# Patient Record
Sex: MALE
[De-identification: names, ages, dates, MRNs, and addresses within clinical notes are randomized per-mention and may not be internally consistent; named-entity substitution may affect disease eponyms.]

---

## 2019-01-28 PROBLEM — D12.4 BENIGN NEOPLASM OF DESCENDING COLON: Status: ACTIVE | Noted: 2019-01-28

## 2019-01-28 PROBLEM — K57.30 DIVERTICULOSIS OF LARGE INTESTINE WITHOUT PERFORATION OR ABSCESS WITHOUT BLEEDING: Status: ACTIVE | Noted: 2019-01-28

## 2019-01-28 PROBLEM — K63.5 POLYP OF COLON: Status: ACTIVE | Noted: 2019-01-28

## 2019-01-28 PROBLEM — D12.3 BENIGN NEOPLASM OF TRANSVERSE COLON: Status: ACTIVE | Noted: 2019-01-28

## 2019-01-28 PROBLEM — K64.8 OTHER HEMORRHOIDS: Status: ACTIVE | Noted: 2019-01-28

## 2019-01-28 PROBLEM — Z86.010 PERSONAL HISTORY OF COLONIC POLYPS: Status: ACTIVE | Noted: 2019-01-28

## 2020-01-01 ENCOUNTER — EXTERNAL RECORD (OUTPATIENT)
Dept: HEALTH INFORMATION MANAGEMENT | Facility: OTHER | Age: 70
End: 2020-01-01

## 2020-03-12 ENCOUNTER — HOSPITAL ENCOUNTER (OUTPATIENT)
Dept: CT IMAGING | Facility: HOSPITAL | Age: 70
Discharge: HOME | End: 2020-03-12
Attending: INTERNAL MEDICINE

## 2020-03-12 DIAGNOSIS — Z13.9 ENCOUNTER FOR SCREENING: ICD-10-CM

## 2020-03-12 NOTE — IMAGING NOTE
Patient seen at [facility] for Heart Scan CT    PRELIMINARY SCORE: 163    /80  Declined Cholesterol check. States that his cholesterol is high but does not want to be medicated.   All results and risk factors discussed with patient; all questions and concern

## 2020-03-14 ENCOUNTER — ORDER TRANSCRIPTION (OUTPATIENT)
Dept: ADMINISTRATIVE | Facility: HOSPITAL | Age: 70
End: 2020-03-14

## 2020-03-14 DIAGNOSIS — Z13.9 SCREENING PROCEDURE: Primary | ICD-10-CM

## 2020-03-16 ENCOUNTER — HOSPITAL ENCOUNTER (OUTPATIENT)
Dept: CARDIOLOGY CLINIC | Facility: HOSPITAL | Age: 70
Discharge: HOME | End: 2020-03-16
Attending: INTERNAL MEDICINE

## 2020-03-16 DIAGNOSIS — Z13.9 SCREENING PROCEDURE: ICD-10-CM

## 2020-03-18 ENCOUNTER — TELEPHONE (OUTPATIENT)
Dept: CARDIOLOGY | Age: 70
End: 2020-03-18

## 2020-03-18 ENCOUNTER — EXTERNAL RECORD (OUTPATIENT)
Dept: CARDIOLOGY | Age: 70
End: 2020-03-18

## 2020-06-17 ENCOUNTER — TELEPHONE (OUTPATIENT)
Dept: CARDIOLOGY | Age: 70
End: 2020-06-17

## 2020-11-11 ENCOUNTER — APPOINTMENT (OUTPATIENT)
Dept: CARDIOLOGY | Age: 70
End: 2020-11-11

## 2021-03-15 DIAGNOSIS — Z23 NEED FOR VACCINATION: ICD-10-CM

## 2021-04-16 ENCOUNTER — LAB ENCOUNTER (OUTPATIENT)
Dept: LAB | Facility: HOSPITAL | Age: 71
End: 2021-04-16
Attending: INTERNAL MEDICINE
Payer: MEDICARE

## 2021-04-16 DIAGNOSIS — D72.829 ELEVATED WBCS: Primary | ICD-10-CM

## 2021-04-16 PROCEDURE — 36415 COLL VENOUS BLD VENIPUNCTURE: CPT

## 2021-04-16 PROCEDURE — 85025 COMPLETE CBC W/AUTO DIFF WBC: CPT

## 2021-07-07 ENCOUNTER — LAB ENCOUNTER (OUTPATIENT)
Dept: LAB | Facility: HOSPITAL | Age: 71
End: 2021-07-07
Attending: INTERNAL MEDICINE
Payer: MEDICARE

## 2021-07-07 DIAGNOSIS — D72.829 ELEVATED WBCS: Primary | ICD-10-CM

## 2021-07-07 LAB
BASOPHILS # BLD AUTO: 0.05 X10(3) UL (ref 0–0.2)
BASOPHILS NFR BLD AUTO: 0.4 %
DEPRECATED RDW RBC AUTO: 47.4 FL (ref 35.1–46.3)
EOSINOPHIL # BLD AUTO: 0.21 X10(3) UL (ref 0–0.7)
EOSINOPHIL NFR BLD AUTO: 1.7 %
ERYTHROCYTE [DISTWIDTH] IN BLOOD BY AUTOMATED COUNT: 13.7 % (ref 11–15)
HCT VFR BLD AUTO: 47.1 %
HGB BLD-MCNC: 15.7 G/DL
IMM GRANULOCYTES # BLD AUTO: 0.02 X10(3) UL (ref 0–1)
IMM GRANULOCYTES NFR BLD: 0.2 %
LYMPHOCYTES # BLD AUTO: 6.7 X10(3) UL (ref 1–4)
LYMPHOCYTES NFR BLD AUTO: 55.1 %
MCH RBC QN AUTO: 31.5 PG (ref 26–34)
MCHC RBC AUTO-ENTMCNC: 33.3 G/DL (ref 31–37)
MCV RBC AUTO: 94.4 FL
MONOCYTES # BLD AUTO: 0.67 X10(3) UL (ref 0.1–1)
MONOCYTES NFR BLD AUTO: 5.5 %
NEUTROPHILS # BLD AUTO: 4.52 X10 (3) UL (ref 1.5–7.7)
NEUTROPHILS # BLD AUTO: 4.52 X10(3) UL (ref 1.5–7.7)
NEUTROPHILS NFR BLD AUTO: 37.1 %
PLATELET # BLD AUTO: 243 10(3)UL (ref 150–450)
RBC # BLD AUTO: 4.99 X10(6)UL
WBC # BLD AUTO: 12.2 X10(3) UL (ref 4–11)

## 2021-07-07 PROCEDURE — 85025 COMPLETE CBC W/AUTO DIFF WBC: CPT

## 2021-07-07 PROCEDURE — 36415 COLL VENOUS BLD VENIPUNCTURE: CPT

## 2021-07-22 ENCOUNTER — OFFICE VISIT (OUTPATIENT)
Dept: HEMATOLOGY/ONCOLOGY | Facility: HOSPITAL | Age: 71
End: 2021-07-22
Attending: INTERNAL MEDICINE
Payer: MEDICARE

## 2021-07-22 VITALS
RESPIRATION RATE: 18 BRPM | HEART RATE: 71 BPM | DIASTOLIC BLOOD PRESSURE: 78 MMHG | TEMPERATURE: 98 F | SYSTOLIC BLOOD PRESSURE: 123 MMHG | BODY MASS INDEX: 26 KG/M2 | WEIGHT: 204.38 LBS | OXYGEN SATURATION: 99 %

## 2021-07-22 DIAGNOSIS — R91.1 LUNG NODULE < 6CM ON CT: ICD-10-CM

## 2021-07-22 DIAGNOSIS — Q25.40 ABNORMALITY OF THORACIC AORTA: ICD-10-CM

## 2021-07-22 DIAGNOSIS — D72.820 LYMPHOCYTOSIS: Primary | ICD-10-CM

## 2021-07-22 LAB
BASOPHILS # BLD AUTO: 0.05 X10(3) UL (ref 0–0.2)
BASOPHILS NFR BLD AUTO: 0.4 %
CRP SERPL-MCNC: <0.29 MG/DL (ref ?–0.3)
DEPRECATED RDW RBC AUTO: 46.3 FL (ref 35.1–46.3)
EOSINOPHIL # BLD AUTO: 0.15 X10(3) UL (ref 0–0.7)
EOSINOPHIL NFR BLD AUTO: 1.1 %
ERYTHROCYTE [DISTWIDTH] IN BLOOD BY AUTOMATED COUNT: 13.4 % (ref 11–15)
HCT VFR BLD AUTO: 46.4 %
HGB BLD-MCNC: 15.8 G/DL
IMM GRANULOCYTES # BLD AUTO: 0.03 X10(3) UL (ref 0–1)
IMM GRANULOCYTES NFR BLD: 0.2 %
LDH SERPL L TO P-CCNC: 211 U/L
LYMPHOCYTES # BLD AUTO: 6.77 X10(3) UL (ref 1–4)
LYMPHOCYTES NFR BLD AUTO: 48 %
MCH RBC QN AUTO: 32.1 PG (ref 26–34)
MCHC RBC AUTO-ENTMCNC: 34.1 G/DL (ref 31–37)
MCV RBC AUTO: 94.3 FL
MONOCYTES # BLD AUTO: 0.69 X10(3) UL (ref 0.1–1)
MONOCYTES NFR BLD AUTO: 4.9 %
NEUTROPHILS # BLD AUTO: 6.41 X10 (3) UL (ref 1.5–7.7)
NEUTROPHILS # BLD AUTO: 6.41 X10(3) UL (ref 1.5–7.7)
NEUTROPHILS NFR BLD AUTO: 45.4 %
PLATELET # BLD AUTO: 243 10(3)UL (ref 150–450)
RBC # BLD AUTO: 4.92 X10(6)UL
SED RATE-ML: 8 MM/HR
WBC # BLD AUTO: 14.1 X10(3) UL (ref 4–11)

## 2021-07-22 PROCEDURE — 99205 OFFICE O/P NEW HI 60 MIN: CPT | Performed by: INTERNAL MEDICINE

## 2021-07-22 RX ORDER — MULTIVITAMIN
1 TABLET ORAL DAILY
COMMUNITY
End: 2021-11-11

## 2021-07-22 RX ORDER — CHLORAL HYDRATE 500 MG
1000 CAPSULE ORAL DAILY
COMMUNITY
End: 2021-11-11

## 2021-07-22 RX ORDER — RIBOFLAVIN (VITAMIN B2) 100 MG
100 TABLET ORAL DAILY
COMMUNITY
End: 2021-11-11

## 2021-07-22 RX ORDER — UBIDECARENONE/VIT E ACET 100MG-5
CAPSULE ORAL
COMMUNITY
End: 2021-11-11

## 2021-07-22 RX ORDER — MULTIVIT-MIN/IRON/FOLIC ACID/K 18-600-40
CAPSULE ORAL
COMMUNITY
End: 2021-11-11

## 2021-07-22 RX ORDER — SIMVASTATIN 20 MG
20 TABLET ORAL NIGHTLY
COMMUNITY
End: 2021-11-11

## 2021-07-22 NOTE — PATIENT INSTRUCTIONS
For triage nurse: [phone] Monday through Friday 7:30-5:00.  *Please note this is a new phone number*    After hours or weekends for emergent needs:  [phone].      To schedule diagnostic testing: Central Scheduling: [place] [id number]

## 2021-07-26 LAB
ALBUMIN SERPL ELPH-MCNC: 4.7 G/DL (ref 3.75–5.21)
ALBUMIN/GLOB SERPL: 1.74 {RATIO} (ref 1–2)
ALPHA1 GLOB SERPL ELPH-MCNC: 0.32 G/DL (ref 0.19–0.46)
ALPHA2 GLOB SERPL ELPH-MCNC: 0.73 G/DL (ref 0.48–1.05)
B-GLOBULIN SERPL ELPH-MCNC: 0.71 G/DL (ref 0.68–1.23)
GAMMA GLOB SERPL ELPH-MCNC: 0.94 G/DL (ref 0.62–1.7)
KAPPA LC FREE SER-MCNC: 1.83 MG/DL (ref 0.33–1.94)
KAPPA LC FREE/LAMBDA FREE SER NEPH: 1.93 {RATIO} (ref 0.26–1.65)
LAMBDA LC FREE SERPL-MCNC: 0.95 MG/DL (ref 0.57–2.63)
M PROTEIN MFR SERPL ELPH: 7.4 G/DL (ref 6.4–8.2)

## 2021-07-30 LAB
CD10 CELLS NFR SPEC: 1 %
CD11C CELLS NFR SPEC: 22 %
CD14 CELLS NFR SPEC: 1 %
CD19 CELLS NFR SPEC: 69 %
CD19/CD10 CELLS: <1 %
CD20 CELLS NFR SPEC: 63 %
CD22 CELLS NFR SPEC: 63 %
CD23 CELLS NFR SPEC: 64 %
CD3 CELLS NFR SPEC: 23 %
CD3+CD4+ CELLS NFR SPEC: 20 %
CD3+CD4+ CELLS/CD3+CD8+ CLL SPEC: 6.7
CD3+CD8+ CELLS NFR SPEC: 3 %
CD38 CELLS NFR SPEC: <1 %
CD45 CELLS NFR SPEC: 100 %
CD5 CELLS NFR SPEC: 89 %
CD5/CD19 CELLS: 67 %
CD56 CELLS NFR SPEC: 6 %
CD7 CELLS NFR SPEC: 26 %
CELL SURF KAPPA/LAMBDA RATIO: 1.7
CELL SURF LAMBDA LIGHT CHAIN: 3 %
CELL SURFACE KAPPA LIGHT CHAIN: 5 %
CYTOPLASMIC KAPPA CELLS: 50 %
CYTOPLASMIC LAMBDA CELLS: 22 %
FMC7 CELLS NFR SPEC: 2 %

## 2021-08-03 NOTE — CONSULTS
Cancer Center Report of Consultation    Patient Name: Ilda Webb   YOB: 1950   Medical Record Number: [id number]   CSN: [id number]   Consulting Physician: Khoa Giraldo MD  Referring Physician(s): Joby Servin MD      Date of C Smoking status: Never Smoker      Smokeless tobacco: Never Used    Vaping Use      Vaping Use: Never used    Substance and Sexual Activity      Alcohol use: Yes        Comment: Very rarely- - usually a beer      Drug use: No      Sexual activity: Not on fi 14-point ROS was done with pertinent positives and negative per the HPI    Vital Signs:  /78 (BP Location: Left arm, Patient Position: Sitting, Cuff Size: adult)   Pulse 71   Temp 97.7 °F (36.5 °C) (Tympanic)   Resp 18   Wt 92.7 kg (204 lb 6.4 oz) Protein 7.4 07/22/2021 1347             Radiology:    PROCEDURE: CT CALCIUM SCORING OVER READ       COMPARISON: None.       INDICATIONS: Baseline study       NOTE:   This patient identified you as their physician. Marlon Bergman this is not correct, please contact th pulmonary malignancy, no additional follow-up is needed.  If patient has risk factors for pulmonary malignancy a follow-up CT in 1 year is recommended. 2. Mild ectasia of the ascending aorta.    3. Multiple incompletely characterized hypoattenuating hepat possible.         INCIDENTAL FINDINGS:  Please refer to the radiologist's separate over read report.                   Impression   CONCLUSION:  Calcium score of 163.       Reference Database: Thierno. 2001       Calcium Score Implication Risk of Coronary Art cardiology      W/u as ordered    RTC 2 weeks    Verner Crape, MD  [facility] [place] [facility]

## 2021-08-05 ENCOUNTER — OFFICE VISIT (OUTPATIENT)
Dept: HEMATOLOGY/ONCOLOGY | Facility: HOSPITAL | Age: 71
End: 2021-08-05
Attending: INTERNAL MEDICINE
Payer: MEDICARE

## 2021-08-05 VITALS
DIASTOLIC BLOOD PRESSURE: 82 MMHG | HEART RATE: 87 BPM | TEMPERATURE: 97 F | SYSTOLIC BLOOD PRESSURE: 127 MMHG | OXYGEN SATURATION: 98 % | BODY MASS INDEX: 25 KG/M2 | RESPIRATION RATE: 18 BRPM | WEIGHT: 201 LBS

## 2021-08-05 DIAGNOSIS — Q25.40 ABNORMALITY OF THORACIC AORTA: ICD-10-CM

## 2021-08-05 DIAGNOSIS — D72.820 LYMPHOCYTOSIS: Primary | ICD-10-CM

## 2021-08-05 DIAGNOSIS — D72.820 MONOCLONAL B-CELL LYMPHOCYTOSIS: ICD-10-CM

## 2021-08-05 DIAGNOSIS — R91.1 LUNG NODULE < 6CM ON CT: ICD-10-CM

## 2021-08-05 PROCEDURE — 99215 OFFICE O/P EST HI 40 MIN: CPT | Performed by: INTERNAL MEDICINE

## 2021-08-05 NOTE — PROGRESS NOTES
Cancer Center Progress Note    Patient Name: Jaguar Shaw   YOB: 1950   Medical Record Number: [id number]   CSN: [id number]   Attending Physician: Blanco Mckenzie M.D.    Referring Physician: Fozia Moreno MD      Date of Visit: 8/5/ activity: Not on file    Other Topics      Concerns:        Not on file    Social History Narrative      Not on file    Social Determinants of Health  Financial Resource Strain:       Difficulty of Paying Living Expenses:   Food Insecurity:       Worried A kg (201 lb)   SpO2 98%   BMI 25.12 kg/m²     Physical Examination:  General: Patient is alert and oriented x 3, not in acute distress. HEENT: EOMs intact. PERRL. Oropharynx is clear. Neck: No JVD. No palpable lymphadenopathy. Neck is supple.   Chest: Anthony cytometry sample was found to contain approximately:   40%Lymphocytes (bright CD45+, low side scatter)   23%CD3+T cells  69%CD19+B cells  6 %NK cells (CD3-/CD16+CD56+)    FLOW CYTOMETRY RESULTS INTERPRETATION:     A 20 marker panel was performed on the sub Ref Range: 87 - 241 U/L 211   KAPPA FREE LIGHT CHAIN Latest Ref Range: 0.330 - 1.940 mg/dL 1.829   LAMBDA FREE LIGHT CHAIN Latest Ref Range: 0.571 - 2.630 mg/dL 0.949   KAPPA/LAMBDA FLC RATIO Latest Ref Range: 0.26 - 1.65  1.93 (H)   TOTAL PROTEIN Latest R cardiologist.         FINDINGS:     MEDIASTINUM/MAURISIO: Normal visualized portions. .  No mass or adenopathy.     LUNGS/PLEURA: A 5 x 4 mm peripheral right middle lobe pulmonary nodule on image 52 series 4.  .     VASCULATURE: Mild ectasia of the ascending aor scanner.  Coronary artery calcium scanning with 3-dimensional scoring was   performed according to standardized protocol. Automated exposure control for dose reduction was used. Adjustment of the mA and/or kV was done based on the patient's size.  Use of it Impression & Plan:   1. Elevated WBC, lymphocytosis- I reviewed his labs with him and his wife. 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  As monoclonal B cell population is <5000 (5159) with no systemic

## 2021-11-11 ENCOUNTER — OFFICE VISIT (OUTPATIENT)
Dept: HEMATOLOGY/ONCOLOGY | Facility: HOSPITAL | Age: 71
End: 2021-11-11
Attending: INTERNAL MEDICINE
Payer: MEDICARE

## 2021-11-11 VITALS
TEMPERATURE: 98 F | RESPIRATION RATE: 18 BRPM | DIASTOLIC BLOOD PRESSURE: 93 MMHG | BODY MASS INDEX: 25 KG/M2 | OXYGEN SATURATION: 99 % | SYSTOLIC BLOOD PRESSURE: 144 MMHG | HEART RATE: 75 BPM | WEIGHT: 202 LBS

## 2021-11-11 DIAGNOSIS — R91.1 LUNG NODULE < 6CM ON CT: ICD-10-CM

## 2021-11-11 DIAGNOSIS — Q25.40 ABNORMALITY OF THORACIC AORTA: ICD-10-CM

## 2021-11-11 DIAGNOSIS — D72.820 LYMPHOCYTOSIS: Primary | ICD-10-CM

## 2021-11-11 DIAGNOSIS — D72.820 MONOCLONAL B-CELL LYMPHOCYTOSIS: ICD-10-CM

## 2021-11-11 PROCEDURE — 99214 OFFICE O/P EST MOD 30 MIN: CPT | Performed by: INTERNAL MEDICINE

## 2021-11-11 NOTE — PROGRESS NOTES
Cancer Center Progress Note    Patient Name: Rosy Sanches   YOB: 1950   Medical Record Number: [id number]   CSN: [id number]   Attending Physician: Christiano Carlisle M.D.    Referring Physician: Mardene Pallas, MD      Date of Visit: 11/1       Spouse name: Not on file      Number of children: Not on file      Years of education: Not on file      Highest education level: Not on file    Occupational History      Not on file    Tobacco Use      Smoking status: Never Smoker      Smokele 11:24 AM   Result Value Ref Range    Glucose 98 70 - 99 mg/dL    Sodium 138 136 - 145 mmol/L    Potassium 4.5 3.5 - 5.1 mmol/L    Chloride 107 98 - 112 mmol/L    CO2 27.0 21.0 - 32.0 mmol/L    Anion Gap 4 0 - 18 mmol/L    BUN 12 7 - 18 mg/dL    Creatinine [address] Latest Ref Range: 0.330 - 1.940 mg/dL 1.829   LAMBDA FREE LIGHT CHAIN Latest Ref Range: 0.571 - 2.630 mg/dL 0.949   KAPPA/LAMBDA FLC RATIO Latest Ref Range: 0.26 - 1.65  1.93 (H)   TOTAL PROTEIN Latest Ref Range: 6.4 - 8.2 g/dL FINDINGS:     MEDIASTINUM/MAURISIO: Normal visualized portions. .  No mass or adenopathy.     LUNGS/PLEURA: A 5 x 4 mm peripheral right middle lobe pulmonary nodule on image 52 series 4. .     VASCULATURE: Mild ectasia of the ascending aorta-4.1 cm.  Normal ca artery calcium scanning with 3-dimensional scoring was   performed according to standardized protocol. Automated exposure control for dose reduction was used. Adjustment of the mA and/or kV was done based on the patient's size.  Use of iterative reconstruct Plan:   1. MBL-  I reviewed his labs with him. WBC/lymphocytosis lower than previous visit. Rest of his labs normal. Recommend continued monitoring for progression of MBL to CLL. He is asymptomatic. We have discussed indications for treatment of CLL/SLL.  H

## 2022-02-10 ENCOUNTER — OFFICE VISIT (OUTPATIENT)
Dept: HEMATOLOGY/ONCOLOGY | Facility: HOSPITAL | Age: 72
End: 2022-02-10
Attending: INTERNAL MEDICINE
Payer: MEDICARE

## 2022-02-10 VITALS
SYSTOLIC BLOOD PRESSURE: 150 MMHG | DIASTOLIC BLOOD PRESSURE: 79 MMHG | BODY MASS INDEX: 25.61 KG/M2 | OXYGEN SATURATION: 100 % | HEIGHT: 75 IN | RESPIRATION RATE: 16 BRPM | TEMPERATURE: 97 F | WEIGHT: 206 LBS | HEART RATE: 69 BPM

## 2022-02-10 DIAGNOSIS — D72.820 LYMPHOCYTOSIS: ICD-10-CM

## 2022-02-10 DIAGNOSIS — D72.820 MONOCLONAL B-CELL LYMPHOCYTOSIS: ICD-10-CM

## 2022-02-10 DIAGNOSIS — R91.1 LUNG NODULE < 6CM ON CT: Primary | ICD-10-CM

## 2022-02-10 DIAGNOSIS — Q25.40 ABNORMALITY OF THORACIC AORTA: ICD-10-CM

## 2022-02-10 LAB
ALBUMIN SERPL-MCNC: 4 G/DL (ref 3.4–5)
ALBUMIN/GLOB SERPL: 1.1 {RATIO} (ref 1–2)
ALP LIVER SERPL-CCNC: 97 U/L
ALT SERPL-CCNC: 23 U/L
ANION GAP SERPL CALC-SCNC: 4 MMOL/L (ref 0–18)
AST SERPL-CCNC: 21 U/L (ref 15–37)
BASOPHILS # BLD AUTO: 0.06 X10(3) UL (ref 0–0.2)
BASOPHILS NFR BLD AUTO: 0.5 %
BILIRUB SERPL-MCNC: 0.7 MG/DL (ref 0.1–2)
BUN BLD-MCNC: 11 MG/DL (ref 7–18)
CALCIUM BLD-MCNC: 9.4 MG/DL (ref 8.5–10.1)
CHLORIDE SERPL-SCNC: 107 MMOL/L (ref 98–112)
CO2 SERPL-SCNC: 28 MMOL/L (ref 21–32)
CREAT BLD-MCNC: 1.07 MG/DL
EOSINOPHIL # BLD AUTO: 0.21 X10(3) UL (ref 0–0.7)
EOSINOPHIL NFR BLD AUTO: 1.6 %
ERYTHROCYTE [DISTWIDTH] IN BLOOD BY AUTOMATED COUNT: 13.7 %
GLOBULIN PLAS-MCNC: 3.7 G/DL (ref 2.8–4.4)
GLUCOSE BLD-MCNC: 88 MG/DL (ref 70–99)
HCT VFR BLD AUTO: 49.1 %
HGB BLD-MCNC: 15.8 G/DL
IMM GRANULOCYTES # BLD AUTO: 0.03 X10(3) UL (ref 0–1)
IMM GRANULOCYTES NFR BLD: 0.2 %
LDH SERPL L TO P-CCNC: 171 U/L
LYMPHOCYTES # BLD AUTO: 6.72 X10(3) UL (ref 1–4)
LYMPHOCYTES NFR BLD AUTO: 52.5 %
MCH RBC QN AUTO: 30.4 PG (ref 26–34)
MCHC RBC AUTO-ENTMCNC: 32.2 G/DL (ref 31–37)
MCV RBC AUTO: 94.6 FL
MONOCYTES # BLD AUTO: 0.76 X10(3) UL (ref 0.1–1)
MONOCYTES NFR BLD AUTO: 5.9 %
NEUTROPHILS # BLD AUTO: 5.01 X10 (3) UL (ref 1.5–7.7)
NEUTROPHILS # BLD AUTO: 5.01 X10(3) UL (ref 1.5–7.7)
NEUTROPHILS NFR BLD AUTO: 39.3 %
OSMOLALITY SERPL CALC.SUM OF ELEC: 287 MOSM/KG (ref 275–295)
PLATELET # BLD AUTO: 237 10(3)UL (ref 150–450)
POTASSIUM SERPL-SCNC: 4.1 MMOL/L (ref 3.5–5.1)
PROT SERPL-MCNC: 7.7 G/DL (ref 6.4–8.2)
RBC # BLD AUTO: 5.19 X10(6)UL
SODIUM SERPL-SCNC: 139 MMOL/L (ref 136–145)
WBC # BLD AUTO: 12.8 X10(3) UL (ref 4–11)

## 2022-02-10 PROCEDURE — 99214 OFFICE O/P EST MOD 30 MIN: CPT | Performed by: INTERNAL MEDICINE

## 2022-03-10 PROBLEM — Z00.00 MEDICARE ANNUAL WELLNESS VISIT, INITIAL: Status: ACTIVE | Noted: 2022-03-10

## 2022-05-10 ENCOUNTER — TELEPHONE (OUTPATIENT)
Dept: HEMATOLOGY/ONCOLOGY | Facility: HOSPITAL | Age: 72
End: 2022-05-10

## 2022-05-10 NOTE — TELEPHONE ENCOUNTER
Patient have a Conflict for Thursday 2/01/41 and he cancelled his appointment and did not want to reschedule the appointment with Dr. Lisseth Rodgers.

## 2022-05-12 ENCOUNTER — APPOINTMENT (OUTPATIENT)
Dept: HEMATOLOGY/ONCOLOGY | Facility: HOSPITAL | Age: 72
End: 2022-05-12
Attending: INTERNAL MEDICINE
Payer: MEDICARE

## 2022-07-05 PROBLEM — K57.30 DIVERTICULOSIS OF COLON: Status: ACTIVE | Noted: 2022-07-05

## 2022-07-05 PROBLEM — Z86.010 HISTORY OF ADENOMATOUS POLYP OF COLON: Status: ACTIVE | Noted: 2022-07-05

## 2022-07-05 PROBLEM — K63.5 COLON POLYP: Status: ACTIVE | Noted: 2022-07-05

## 2022-07-05 PROBLEM — K55.20 ANGIODYSPLASIA OF COLON WITHOUT BLEEDING: Status: ACTIVE | Noted: 2022-07-05

## 2022-07-05 PROBLEM — K64.8 INTERNAL HEMORRHOIDS: Status: ACTIVE | Noted: 2022-07-05

## 2022-07-05 PROBLEM — D12.2 BENIGN NEOPLASM OF ASCENDING COLON: Status: ACTIVE | Noted: 2022-07-05

## 2022-08-10 ENCOUNTER — APPOINTMENT (OUTPATIENT)
Dept: URBAN - METROPOLITAN AREA CLINIC 245 | Age: 72
Setting detail: DERMATOLOGY
End: 2022-08-10

## 2022-08-10 PROBLEM — C44.712 BASAL CELL CARCINOMA OF SKIN OF RIGHT LOWER LIMB, INCLUDING HIP: Status: ACTIVE | Noted: 2022-08-10

## 2022-08-10 PROCEDURE — A4550 SURGICAL TRAYS: HCPCS

## 2022-08-10 PROCEDURE — OTHER PRESCRIPTION: OTHER

## 2022-08-10 PROCEDURE — 11602 EXC TR-EXT MAL+MARG 1.1-2 CM: CPT

## 2022-08-10 PROCEDURE — OTHER EXCISION: OTHER

## 2022-08-10 RX ORDER — MUPIROCIN 20 MG/G
OINTMENT TOPICAL
Qty: 22 | Refills: 3 | Status: ERX | COMMUNITY
Start: 2022-08-10

## 2022-08-10 NOTE — PROCEDURE: EXCISION
Body Location Override (Optional - Billing Will Still Be Based On Selected Body Map Location If Applicable): right mid medial leg

## 2023-05-03 ENCOUNTER — APPOINTMENT (OUTPATIENT)
Dept: URBAN - METROPOLITAN AREA CLINIC 245 | Age: 73
Setting detail: DERMATOLOGY
End: 2023-05-08

## 2023-05-03 DIAGNOSIS — L82.1 OTHER SEBORRHEIC KERATOSIS: ICD-10-CM

## 2023-05-03 DIAGNOSIS — L91.8 OTHER HYPERTROPHIC DISORDERS OF THE SKIN: ICD-10-CM

## 2023-05-03 DIAGNOSIS — D22 MELANOCYTIC NEVI: ICD-10-CM

## 2023-05-03 DIAGNOSIS — L57.8 OTHER SKIN CHANGES DUE TO CHRONIC EXPOSURE TO NONIONIZING RADIATION: ICD-10-CM

## 2023-05-03 DIAGNOSIS — L72.8 OTHER FOLLICULAR CYSTS OF THE SKIN AND SUBCUTANEOUS TISSUE: ICD-10-CM

## 2023-05-03 DIAGNOSIS — L57.0 ACTINIC KERATOSIS: ICD-10-CM

## 2023-05-03 DIAGNOSIS — D36.1 BENIGN NEOPLASM OF PERIPHERAL NERVES AND AUTONOMIC NERVOUS SYSTEM: ICD-10-CM

## 2023-05-03 DIAGNOSIS — D18.0 HEMANGIOMA: ICD-10-CM

## 2023-05-03 DIAGNOSIS — Z85.828 PERSONAL HISTORY OF OTHER MALIGNANT NEOPLASM OF SKIN: ICD-10-CM

## 2023-05-03 PROBLEM — D36.10 BENIGN NEOPLASM OF PERIPHERAL NERVES AND AUTONOMIC NERVOUS SYSTEM, UNSPECIFIED: Status: ACTIVE | Noted: 2023-05-03

## 2023-05-03 PROBLEM — D18.01 HEMANGIOMA OF SKIN AND SUBCUTANEOUS TISSUE: Status: ACTIVE | Noted: 2023-05-03

## 2023-05-03 PROBLEM — D22.5 MELANOCYTIC NEVI OF TRUNK: Status: ACTIVE | Noted: 2023-05-03

## 2023-05-03 PROCEDURE — OTHER MIPS QUALITY: OTHER

## 2023-05-03 PROCEDURE — 17003 DESTRUCT PREMALG LES 2-14: CPT

## 2023-05-03 PROCEDURE — 99213 OFFICE O/P EST LOW 20 MIN: CPT | Mod: 25

## 2023-05-03 PROCEDURE — OTHER LIQUID NITROGEN: OTHER

## 2023-05-03 PROCEDURE — OTHER OBSERVATION: OTHER

## 2023-05-03 PROCEDURE — OTHER COUNSELING: OTHER

## 2023-05-03 PROCEDURE — 17000 DESTRUCT PREMALG LESION: CPT

## 2023-05-03 ASSESSMENT — LOCATION DETAILED DESCRIPTION DERM
LOCATION DETAILED: RIGHT FOREHEAD
LOCATION DETAILED: LEFT SUPERIOR PARIETAL SCALP
LOCATION DETAILED: LEFT INFERIOR MEDIAL MALAR CHEEK
LOCATION DETAILED: LEFT RADIAL DORSAL HAND
LOCATION DETAILED: RIGHT NASAL ALA
LOCATION DETAILED: UPPER STERNUM
LOCATION DETAILED: LEFT MEDIAL ZYGOMA
LOCATION DETAILED: LEFT SUPERIOR PREAURICULAR CHEEK
LOCATION DETAILED: RIGHT MEDIAL SUPERIOR CHEST
LOCATION DETAILED: RIGHT SUPERIOR PREAURICULAR CHEEK
LOCATION DETAILED: LEFT FOREHEAD
LOCATION DETAILED: LEFT SUPERIOR MEDIAL UPPER BACK
LOCATION DETAILED: RIGHT CENTRAL FRONTAL SCALP
LOCATION DETAILED: LEFT MEDIAL FRONTAL SCALP
LOCATION DETAILED: LEFT SUPERIOR FOREHEAD
LOCATION DETAILED: LEFT MEDIAL UPPER BACK
LOCATION DETAILED: LEFT CENTRAL FRONTAL SCALP
LOCATION DETAILED: LEFT SUPERIOR CENTRAL MALAR CHEEK
LOCATION DETAILED: INFERIOR THORACIC SPINE
LOCATION DETAILED: RIGHT INFERIOR UPPER BACK
LOCATION DETAILED: LEFT INFERIOR ANTERIOR NECK

## 2023-05-03 ASSESSMENT — LOCATION SIMPLE DESCRIPTION DERM
LOCATION SIMPLE: RIGHT NOSE
LOCATION SIMPLE: CHEST
LOCATION SIMPLE: LEFT HAND
LOCATION SIMPLE: SCALP
LOCATION SIMPLE: LEFT CHEEK
LOCATION SIMPLE: LEFT SCALP
LOCATION SIMPLE: RIGHT UPPER BACK
LOCATION SIMPLE: LEFT ANTERIOR NECK
LOCATION SIMPLE: UPPER BACK
LOCATION SIMPLE: RIGHT FOREHEAD
LOCATION SIMPLE: LEFT UPPER BACK
LOCATION SIMPLE: LEFT FOREHEAD
LOCATION SIMPLE: LEFT ZYGOMA
LOCATION SIMPLE: RIGHT CHEEK

## 2023-05-03 ASSESSMENT — LOCATION ZONE DERM
LOCATION ZONE: NECK
LOCATION ZONE: SCALP
LOCATION ZONE: FACE
LOCATION ZONE: TRUNK
LOCATION ZONE: NOSE
LOCATION ZONE: HAND

## 2023-05-03 NOTE — PROCEDURE: LIQUID NITROGEN
Render Note In Bullet Format When Appropriate: No
Application Tool (Optional): Cry-AC
Number Of Freeze-Thaw Cycles: 2 freeze-thaw cycles
Detail Level: Detailed
Render Post-Care Instructions In Note?: yes
Consent: The patient's consent was obtained including but not limited to risks of crusting, scabbing, blistering, scarring, darker or lighter pigmentary change, recurrence, incomplete removal and infection.
Post-Care Instructions: I reviewed with the patient in detail post-care instructions. Patient is to wear sunprotection, and avoid picking at any of the treated lesions. Pt may apply Vaseline to crusted or scabbing areas.
Duration Of Freeze Thaw-Cycle (Seconds): 5

## 2023-05-03 NOTE — PROCEDURE: COUNSELING
Sunscreen Recommendations: SPF 50 or higher, applied daily or hourly when heavy sun exposure is anticipated
Detail Level: Detailed
Detail Level: Zone
Detail Level: Simple
Nicotinamide Supplementation Recommendations: Nicotinamide is purchased over-the-counter in 500 mg capsules.  Nicotinamide should be taken as one 500 mg capsule twice a day. Supplementation with Nicotinamide does not replace sunscreen application.

## 2023-05-03 NOTE — PROCEDURE: OBSERVATION
Size Of Lesion In Cm (Optional): 0
Detail Level: Detailed
Body Location Override (Optional - Billing Will Still Be Based On Selected Body Map Location If Applicable): mid back
Body Location Override (Optional - Billing Will Still Be Based On Selected Body Map Location If Applicable): right mid back near midline

## 2025-02-14 NOTE — PROCEDURE: EXCISION
See telephone encounter 2/13/25   Excisional Biopsy Additional Text (Leave Blank If You Do Not Want): The margin was drawn around the clinically apparent lesion. An elliptical shape was then drawn on the skin incorporating the lesion and margins.  Incisions were then made along these lines to the appropriate tissue plane and the lesion was extirpated.